# Patient Record
Sex: MALE | Race: BLACK OR AFRICAN AMERICAN | NOT HISPANIC OR LATINO | URBAN - METROPOLITAN AREA
[De-identification: names, ages, dates, MRNs, and addresses within clinical notes are randomized per-mention and may not be internally consistent; named-entity substitution may affect disease eponyms.]

---

## 2018-03-26 ENCOUNTER — EMERGENCY (EMERGENCY)
Facility: HOSPITAL | Age: 43
LOS: 1 days | Discharge: HOME | End: 2018-03-26
Attending: EMERGENCY MEDICINE | Admitting: EMERGENCY MEDICINE
Payer: OTHER MISCELLANEOUS

## 2018-03-26 VITALS
OXYGEN SATURATION: 97 % | TEMPERATURE: 98 F | SYSTOLIC BLOOD PRESSURE: 144 MMHG | DIASTOLIC BLOOD PRESSURE: 90 MMHG | RESPIRATION RATE: 16 BRPM | HEART RATE: 61 BPM

## 2018-03-26 DIAGNOSIS — M79.641 PAIN IN RIGHT HAND: ICD-10-CM
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
## 2018-03-26 DIAGNOSIS — S60.221A CONTUSION OF RIGHT HAND, INITIAL ENCOUNTER: ICD-10-CM
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
## 2018-03-26 PROCEDURE — 99283 EMERGENCY DEPT VISIT LOW MDM: CPT

## 2018-03-26 PROCEDURE — 73130 X-RAY EXAM OF HAND: CPT | Mod: 26,RT

## 2018-03-26 RX ORDER — IBUPROFEN 200 MG
1 TABLET ORAL
Qty: 21 | Refills: 0 | OUTPATIENT
Start: 2018-03-26 | End: 2018-04-01

## 2018-03-26 RX ORDER — ACETAMINOPHEN 500 MG
650 TABLET ORAL ONCE
Qty: 0 | Refills: 0 | Status: COMPLETED | OUTPATIENT
Start: 2018-03-26 | End: 2018-03-26

## 2018-03-26 RX ORDER — IBUPROFEN 200 MG
600 TABLET ORAL ONCE
Qty: 0 | Refills: 0 | Status: COMPLETED | OUTPATIENT
Start: 2018-03-26 | End: 2018-03-26

## 2018-03-26 RX ADMIN — Medication 650 MILLIGRAM(S): at 15:26

## 2018-03-26 RX ADMIN — Medication 600 MILLIGRAM(S): at 15:26

## 2018-03-26 NOTE — ED PROVIDER NOTE - OBJECTIVE STATEMENT
Pt is a 43 y/o M presenting to the ED with c/o R hand pain, onset today. Pt reports he was working with a frame when it dropped onto his hand. He states he has been unable to make a fist with the R hand since injury. Associated swelling. Denies numbness/tingling/weakness. Did not take any medications prior to arrival.

## 2019-02-18 ENCOUNTER — EMERGENCY (EMERGENCY)
Facility: HOSPITAL | Age: 44
LOS: 1 days | Discharge: HOME | End: 2019-02-18
Admitting: EMERGENCY MEDICINE
Payer: OTHER MISCELLANEOUS

## 2019-02-18 VITALS
HEART RATE: 68 BPM | TEMPERATURE: 99 F | RESPIRATION RATE: 16 BRPM | OXYGEN SATURATION: 97 % | SYSTOLIC BLOOD PRESSURE: 149 MMHG | DIASTOLIC BLOOD PRESSURE: 83 MMHG
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
## 2019-02-18 DIAGNOSIS — Z79.1 LONG TERM (CURRENT) USE OF NON-STEROIDAL ANTI-INFLAMMATORIES (NSAID): ICD-10-CM

## 2019-02-18 DIAGNOSIS — S63.255A UNSPECIFIED DISLOCATION OF LEFT RING FINGER, INITIAL ENCOUNTER: ICD-10-CM
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
## 2019-02-18 DIAGNOSIS — M79.645 PAIN IN LEFT FINGER(S): ICD-10-CM
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
## 2019-02-18 PROCEDURE — 99284 EMERGENCY DEPT VISIT MOD MDM: CPT

## 2019-02-18 PROCEDURE — 73140 X-RAY EXAM OF FINGER(S): CPT | Mod: 26,LT

## 2019-02-18 NOTE — ED PROVIDER NOTE - CARE PROVIDER_API CALL
Brian Weinstein)  [facility]  Plastic  [address] [place] [address]  Phone: [phone]  Fax: [phone]  Follow Up Time:

## 2019-02-18 NOTE — ED PROVIDER NOTE - MUSCULOSKELETAL, MLM
L 4th finger; medially deviated from the PIP w/ some swelling, TTP over PIP, ROM is limited secondary to pain, sensation is intact, cap refill <3 sec. Rest of hand is non tender, nml ROM. L 4th finger; medially deviated from the PIP w/ some swelling, TTP over PIP, unable to flex at PIP, sensation is intact, cap refill <3 sec. Rest of hand is non tender, nml ROM.

## 2019-02-18 NOTE — ED ADULT NURSE NOTE - CINV DISCH TEACH PARTICIP
"Chief Complaint   Patient presents with     Diarrhea     Abdominal Pain       Initial BP 92/58 (BP Location: Left arm, Patient Position: Chair, Cuff Size: Adult Regular)  Pulse 97  Temp 97.7  F (36.5  C) (Oral)  Resp 12  Wt 171 lb (77.6 kg)  SpO2 98%  BMI 22.56 kg/m2 Estimated body mass index is 22.56 kg/(m^2) as calculated from the following:    Height as of 5/25/17: 6' 1\" (1.854 m).    Weight as of this encounter: 171 lb (77.6 kg).  Medication Reconciliation: complete     Ana Spicer, CMA      "
Patient

## 2019-02-18 NOTE — ED PROVIDER NOTE - CLINICAL SUMMARY MEDICAL DECISION MAKING FREE TEXT BOX
Obtain XR of L 4th finger to r/o fracture. Obtain XR of L 4th finger to r/o fracture/dislocation.     XR shows dislocation. unable to reduce. d/w alisa tapia (hand) who will see in office as dislocation occurred 1 week ago. will likely require surgery to repair.

## 2019-02-18 NOTE — ED PROVIDER NOTE - ATTENDING CONTRIBUTION TO CARE
Pt w finger injury, happened 1 week ago, swollen and  deformed. on exam L 4th finger PIP swelling and deformed. on xray - dislocation/subluxationn, attempted reduction without success. Suspect intraarticular injury in PIP. Hand Sx consulted and recommends splint in finger and office F/U. Will most likely need OR

## 2019-02-18 NOTE — ED ADULT NURSE NOTE - OBJECTIVE STATEMENT
c/o pain to left 4th finger from fall 1 week PTA " I jammed it when I was trying to break my fall" + swelling, + ttp. + capp refill WNL, +movement and sensation. Awaiting xray; will monitor and f/u as indicated

## 2019-02-18 NOTE — ED PROVIDER NOTE - OBJECTIVE STATEMENT
42 y/o M w/ no hx, nkda presents to ED w/ c/o L 4th finger pain s/p fall a week ago. Pt went to work and his boss told him to come in. Denies numbness, tingling. 42 y/o M w/ no hx, nkda presents to ED w/ c/o L 4th finger pain and swelling s/p fall a week ago. Pt went to work and his boss told him to come in. Denies numbness, tingling.

## 2019-02-18 NOTE — ED PROVIDER NOTE - NSFOLLOWUPINSTRUCTIONS_ED_ALL_ED_FT
CALL DR. CARTER TOMORROW TO SCHEDULE AN APPOINTMENT FOR THIS WEEK.     YOUR FINGER IS DISLOCATED AND BECAUSE IT HAS BEEN DISLOCATED FOR A WEEK WE WERE UNABLE TO REDUCE IT. YOU MAY REQUIRE SURGERY TO FIX THIS SO IT IS IMPORTANT THAT YOU FOLLOW UP WITH HIM.     RETURN TO ER FOR NUMBNESS, TINGLING, WEAKNESS, ANY OTHER CONCERNS. KEEP FINGER SPLINT IN PLACE UNTIL YOU SEE DR. CARTER.    CAN TAKE TYLENOL OR MOTRIN EVERY 6 HOURS AS NEEDED FOR PAIN.

## 2025-06-12 NOTE — ED ADULT NURSE NOTE - NS ED NURSE DC INFO COMPLEXITY
Per Dr. Vidales, schedule pt the day before or the day after EGD. Writer called pt to inform. Pt rescheduled for 07/02/2025. Pt verbalized understanding.    Moderate: Comprehensive teaching